# Patient Record
Sex: FEMALE
[De-identification: names, ages, dates, MRNs, and addresses within clinical notes are randomized per-mention and may not be internally consistent; named-entity substitution may affect disease eponyms.]

---

## 2023-08-02 ENCOUNTER — NURSE TRIAGE (OUTPATIENT)
Dept: OTHER | Facility: CLINIC | Age: 73
End: 2023-08-02

## 2023-11-23 NOTE — TELEPHONE ENCOUNTER
Location of patient: [place]    Subjective: Caller states \"I do have a call into my physician assistant. I have Covid and yesterday my physician assistant called in Prednisone for my cough. I shouldn't be on Prednisone with rebound Covid. How soon can I taper off?\"     Current Symptoms:   Patient denies any new or worsening symptoms and is only looking for medication advice. Recommended disposition:  Call PCP Now    Care advice provided, patient verbalizes understanding; denies any other questions or concerns; instructed to call back for any new or worsening symptoms. Patient states she has already called her PCP office today and cannot speak with her PA until tomorrow. Advised her that she could call the pharmacist where the medication was prescribed for further information; she verbalized understanding and has no further questions at this time. This triage is a result of a call to [address] [place] [address]. Please do not respond to the triage nurse through this encounter. Any subsequent communication should be directly with the patient.     Reason for Disposition   [1] Caller has URGENT medicine question about med that PCP or specialist prescribed AND [2] triager unable to answer question    Protocols used: Medication Question Call-ADULT- per mom fell and hit face between bed and dresser, +lac to bottom of chin. bleeding controlled. awake alert. -LOC. -[facility] -allergies VUTD